# Patient Record
Sex: FEMALE | Race: WHITE | NOT HISPANIC OR LATINO | URBAN - NONMETROPOLITAN AREA
[De-identification: names, ages, dates, MRNs, and addresses within clinical notes are randomized per-mention and may not be internally consistent; named-entity substitution may affect disease eponyms.]

---

## 2018-02-13 PROBLEM — H52.13: Noted: 2018-02-13

## 2018-02-13 PROBLEM — H52.223: Noted: 2018-02-13

## 2018-02-13 PROBLEM — H52.4: Noted: 2019-10-25

## 2019-10-25 ENCOUNTER — IMPORTED ENCOUNTER (OUTPATIENT)
Dept: URBAN - NONMETROPOLITAN AREA CLINIC 1 | Facility: CLINIC | Age: 44
End: 2019-10-25

## 2019-10-25 PROBLEM — H52.223: Noted: 2018-02-13

## 2019-10-25 PROBLEM — H52.4: Noted: 2019-10-25

## 2019-10-25 PROBLEM — H52.13: Noted: 2018-02-13

## 2019-10-25 PROCEDURE — 92014 COMPRE OPH EXAM EST PT 1/>: CPT

## 2019-10-25 PROCEDURE — 92015 DETERMINE REFRACTIVE STATE: CPT

## 2019-10-25 NOTE — PATIENT DISCUSSION
Compound Myopic Astigmatism OU w/Incipient Presbyopia-  Discussed refractive status w/ pt today-  New spectacle Rx issued-  Continue to monitor yearly or prn; 's Notes:  10/25/2019<br />PATT 10/25/2019<br />

## 2019-11-14 ENCOUNTER — IMPORTED ENCOUNTER (OUTPATIENT)
Dept: URBAN - NONMETROPOLITAN AREA CLINIC 1 | Facility: CLINIC | Age: 44
End: 2019-11-14

## 2019-11-14 NOTE — PATIENT DISCUSSION
Compound Myopic Astigmatism OU-  discussed findings w/patient-  axis was off OS-  remake Rx -  continue to monitor yearly or prn; 's Notes: MR 10/25/2019DFE 10/25/2019

## 2021-11-05 ENCOUNTER — IMPORTED ENCOUNTER (OUTPATIENT)
Dept: URBAN - NONMETROPOLITAN AREA CLINIC 1 | Facility: CLINIC | Age: 46
End: 2021-11-05

## 2021-11-05 PROCEDURE — 92015 DETERMINE REFRACTIVE STATE: CPT

## 2021-11-05 PROCEDURE — 92014 COMPRE OPH EXAM EST PT 1/>: CPT

## 2021-11-05 NOTE — PATIENT DISCUSSION
Compound Myopic Astigmatism OU-  discussed findings w/patient-  new spectacle Rx issued-  computer Rx issued -  continue to monitor yearly or prn; 's Notes: MR 11/5/2021DFE 11/5/2021

## 2022-04-09 ASSESSMENT — TONOMETRY
OS_IOP_MMHG: 11
OD_IOP_MMHG: 12
OS_IOP_MMHG: 14
OD_IOP_MMHG: 14

## 2022-04-09 ASSESSMENT — VISUAL ACUITY
OD_PH: 20/30
OS_SC: 20/25-1
OU_SC: 20/20
OS_PH: 20/25
OS_SC: 20/20
OD_SC: 20/25
OD_SC: 20/20
OD_SC: 20/25
OS_SC: 20/20-2

## 2025-04-23 ENCOUNTER — NEW PATIENT (OUTPATIENT)
Age: 50
End: 2025-04-23

## 2025-04-23 DIAGNOSIS — H52.13: ICD-10-CM

## 2025-04-23 DIAGNOSIS — H52.4: ICD-10-CM

## 2025-04-23 DIAGNOSIS — H52.223: ICD-10-CM

## 2025-04-23 PROCEDURE — 92004 COMPRE OPH EXAM NEW PT 1/>: CPT

## 2025-04-23 PROCEDURE — 92015 DETERMINE REFRACTIVE STATE: CPT
